# Patient Record
(demographics unavailable — no encounter records)

---

## 2024-10-17 NOTE — ASSESSMENT
[Use of independent historian: [ enter independent historian's relationship to patient ] :____] : As the patient was unable to provide a complete and reliable history, I obtained clinical history from the patient's [unfilled] [FreeTextEntry1] : # Keratosis pilaris, chronic, not at treatment goal - education, counseling. We have discussed treatment options and expectations. Can be difficult to treat but with age can sometimes see improvement.  - start Amlactin otc or LacHydrin, eucerin for rought/bumpy skin or CeraVe SA BID. Start off daily and work up to BID.  #Xerosis cutis, generalized, chronic, not at treatment goal - Education and counseling - Gentle skin care reviewed; - Emphasized to use gentle, fragrance-free personal care products (including soap and laundry detergent). Avoid scrubbing/rubbing skin, no loofas or washcloths. Limit showers to once daily with lukewarm water - [place] use of bland emollients. List of recommended moisturizers provided

## 2024-10-17 NOTE — PHYSICAL EXAM
[FreeTextEntry3] : General: well appearing person in nad, alert, pleasant Focused Skin Exam per patient preference: Left lower back with small keratotic papules xerosis

## 2024-10-17 NOTE — HISTORY OF PRESENT ILLNESS
[FreeTextEntry1] : NPV- bumps on arms and legs [de-identified] : Oct 17 2024  8:30AM   4 year M new patient here for evaluation of bumps on arms and legs, come and go. Today present on right lower back. Not itchy per patients. Mom with eczema. Uses aveeno soap and moisturizer.  All: NKDA No personal or family hx of skin cancer